# Patient Record
Sex: FEMALE | Race: WHITE
[De-identification: names, ages, dates, MRNs, and addresses within clinical notes are randomized per-mention and may not be internally consistent; named-entity substitution may affect disease eponyms.]

---

## 2019-03-30 ENCOUNTER — HOSPITAL ENCOUNTER (EMERGENCY)
Dept: HOSPITAL 94 - ER | Age: 84
Discharge: HOME | End: 2019-03-30
Payer: MEDICARE

## 2019-03-30 VITALS — SYSTOLIC BLOOD PRESSURE: 110 MMHG | DIASTOLIC BLOOD PRESSURE: 82 MMHG

## 2019-03-30 VITALS — WEIGHT: 156.53 LBS | BODY MASS INDEX: 25.16 KG/M2 | HEIGHT: 66 IN

## 2019-03-30 DIAGNOSIS — F03.90: ICD-10-CM

## 2019-03-30 DIAGNOSIS — Z88.2: ICD-10-CM

## 2019-03-30 DIAGNOSIS — L03.115: Primary | ICD-10-CM

## 2019-03-30 LAB
ALBUMIN SERPL BCP-MCNC: 2.9 G/DL (ref 3.4–5)
ALBUMIN/GLOB SERPL: 0.6 {RATIO} (ref 1.1–1.5)
ALP SERPL-CCNC: 74 IU/L (ref 46–116)
ALT SERPL W P-5'-P-CCNC: 26 U/L (ref 12–78)
ANION GAP SERPL CALCULATED.3IONS-SCNC: 7 MMOL/L (ref 8–16)
AST SERPL W P-5'-P-CCNC: 20 U/L (ref 10–37)
BASOPHILS # BLD AUTO: 0.1 X10'3 (ref 0–0.2)
BASOPHILS NFR BLD AUTO: 0.6 % (ref 0–1)
BILIRUB SERPL-MCNC: 0.3 MG/DL (ref 0.1–1)
BUN SERPL-MCNC: 19 MG/DL (ref 7–18)
BUN/CREAT SERPL: 20.9 (ref 6.6–38)
CALCIUM SERPL-MCNC: 9.3 MG/DL (ref 8.5–10.1)
CHLORIDE SERPL-SCNC: 100 MMOL/L (ref 99–107)
CO2 SERPL-SCNC: 28.7 MMOL/L (ref 24–32)
CREAT SERPL-MCNC: 0.91 MG/DL (ref 0.4–0.9)
EOSINOPHIL # BLD AUTO: 2.7 X10'3 (ref 0–0.9)
EOSINOPHIL NFR BLD AUTO: 22.3 % (ref 0–6)
ERYTHROCYTE [DISTWIDTH] IN BLOOD BY AUTOMATED COUNT: 15.5 % (ref 11.5–14.5)
GFR SERPL CREATININE-BSD FRML MDRD: 58 ML/MIN
GLUCOSE SERPL-MCNC: 108 MG/DL (ref 70–104)
HCT VFR BLD AUTO: 37.6 % (ref 35–45)
HGB BLD-MCNC: 12.1 G/DL (ref 12–16)
LYMPHOCYTES # BLD AUTO: 1.9 X10'3 (ref 1.1–4.8)
LYMPHOCYTES NFR BLD AUTO: 16.4 % (ref 21–51)
MCH RBC QN AUTO: 28 PG (ref 27–31)
MCHC RBC AUTO-ENTMCNC: 32.3 G/DL (ref 33–36.5)
MCV RBC AUTO: 86.8 FL (ref 78–98)
MONOCYTES # BLD AUTO: 0.8 X10'3 (ref 0–0.9)
MONOCYTES NFR BLD AUTO: 7.1 % (ref 2–12)
NEUTROPHILS # BLD AUTO: 6.4 X10'3 (ref 1.8–7.7)
NEUTROPHILS NFR BLD AUTO: 53.6 % (ref 42–75)
PLATELET # BLD AUTO: 361 X10'3 (ref 140–440)
PMV BLD AUTO: 8.4 FL (ref 7.4–10.4)
POTASSIUM SERPL-SCNC: 4.5 MMOL/L (ref 3.5–5.1)
PROT SERPL-MCNC: 7.4 G/DL (ref 6.4–8.2)
RBC # BLD AUTO: 4.33 X10'6 (ref 4.2–5.6)
SODIUM SERPL-SCNC: 136 MMOL/L (ref 135–145)
WBC # BLD AUTO: 11.9 X10'3 (ref 4.5–11)

## 2019-03-30 PROCEDURE — 80053 COMPREHEN METABOLIC PANEL: CPT

## 2019-03-30 PROCEDURE — 84145 PROCALCITONIN (PCT): CPT

## 2019-03-30 PROCEDURE — 87040 BLOOD CULTURE FOR BACTERIA: CPT

## 2019-03-30 PROCEDURE — 36415 COLL VENOUS BLD VENIPUNCTURE: CPT

## 2019-03-30 PROCEDURE — 87070 CULTURE OTHR SPECIMN AEROBIC: CPT

## 2019-03-30 PROCEDURE — 99285 EMERGENCY DEPT VISIT HI MDM: CPT

## 2019-03-30 PROCEDURE — 73630 X-RAY EXAM OF FOOT: CPT

## 2019-03-30 PROCEDURE — 96365 THER/PROPH/DIAG IV INF INIT: CPT

## 2019-03-30 PROCEDURE — 99284 EMERGENCY DEPT VISIT MOD MDM: CPT

## 2019-03-30 PROCEDURE — 87077 CULTURE AEROBIC IDENTIFY: CPT

## 2019-03-30 PROCEDURE — 83605 ASSAY OF LACTIC ACID: CPT

## 2019-03-30 PROCEDURE — 87186 SC STD MICRODIL/AGAR DIL: CPT

## 2019-03-30 PROCEDURE — 96375 TX/PRO/DX INJ NEW DRUG ADDON: CPT

## 2019-03-30 PROCEDURE — 96368 THER/DIAG CONCURRENT INF: CPT

## 2019-03-30 PROCEDURE — 85025 COMPLETE CBC W/AUTO DIFF WBC: CPT

## 2019-03-30 NOTE — NUR
spoke with Dougie over the phone update him on poc for pt. I let him know that pt 
will be going back to [facility], all questions answered. I also call [facility] and notified them that pt will be returning. [facility] [employer] has been 
called to transport pt.

## 2019-03-31 ENCOUNTER — HOSPITAL ENCOUNTER (INPATIENT)
Dept: HOSPITAL 94 - ER | Age: 84
LOS: 3 days | Discharge: NURSING, REHAB OR LONG-TERM CARE | End: 2019-04-03
Payer: MEDICARE

## 2019-03-31 DIAGNOSIS — E86.0: Primary | ICD-10-CM
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
## 2019-03-31 DIAGNOSIS — R21: ICD-10-CM

## 2019-04-19 ENCOUNTER — HOSPITAL ENCOUNTER (INPATIENT)
Dept: HOSPITAL 94 - ER | Age: 84
LOS: 3 days | DRG: 871 | End: 2019-04-22
Attending: INTERNAL MEDICINE | Admitting: INTERNAL MEDICINE
Payer: MEDICARE

## 2019-04-19 VITALS — DIASTOLIC BLOOD PRESSURE: 71 MMHG | SYSTOLIC BLOOD PRESSURE: 93 MMHG

## 2019-04-19 VITALS — DIASTOLIC BLOOD PRESSURE: 50 MMHG | SYSTOLIC BLOOD PRESSURE: 139 MMHG

## 2019-04-19 VITALS — HEIGHT: 68 IN | WEIGHT: 121.25 LBS | BODY MASS INDEX: 18.38 KG/M2

## 2019-04-19 DIAGNOSIS — L89.92: ICD-10-CM

## 2019-04-19 DIAGNOSIS — Z88.1: ICD-10-CM

## 2019-04-19 DIAGNOSIS — E87.2: ICD-10-CM

## 2019-04-19 DIAGNOSIS — E87.6: ICD-10-CM

## 2019-04-19 DIAGNOSIS — Z51.5: ICD-10-CM

## 2019-04-19 DIAGNOSIS — L03.116: ICD-10-CM

## 2019-04-19 DIAGNOSIS — Z79.82: ICD-10-CM

## 2019-04-19 DIAGNOSIS — A41.9: Primary | ICD-10-CM

## 2019-04-19 DIAGNOSIS — E86.0: ICD-10-CM

## 2019-04-19 DIAGNOSIS — G93.41: ICD-10-CM

## 2019-04-19 DIAGNOSIS — E87.0: ICD-10-CM

## 2019-04-19 DIAGNOSIS — Z88.2: ICD-10-CM

## 2019-04-19 DIAGNOSIS — R65.20: ICD-10-CM

## 2019-04-19 DIAGNOSIS — Z99.3: ICD-10-CM

## 2019-04-19 DIAGNOSIS — A04.72: ICD-10-CM

## 2019-04-19 DIAGNOSIS — Z88.8: ICD-10-CM

## 2019-04-19 DIAGNOSIS — Z66: ICD-10-CM

## 2019-04-19 DIAGNOSIS — N17.9: ICD-10-CM

## 2019-04-19 DIAGNOSIS — Z79.899: ICD-10-CM

## 2019-04-19 DIAGNOSIS — F03.90: ICD-10-CM

## 2019-04-19 LAB
ALBUMIN SERPL BCP-MCNC: 2.1 G/DL (ref 3.4–5)
ALBUMIN/GLOB SERPL: 0.5 {RATIO} (ref 1.1–1.5)
ALP SERPL-CCNC: 99 IU/L (ref 46–116)
ALT SERPL W P-5'-P-CCNC: 50 U/L (ref 12–78)
ANION GAP SERPL CALCULATED.3IONS-SCNC: 13 MMOL/L (ref 8–16)
ANISOCYTOSIS BLD QL SMEAR: (no result)
APTT PPP: 28 SECONDS (ref 22–32)
AST SERPL W P-5'-P-CCNC: 80 U/L (ref 10–37)
BACTERIA URNS QL MICRO: (no result) /HPF
BASOPHILS # BLD AUTO: 0 X10'3 (ref 0–0.2)
BASOPHILS NFR BLD AUTO: 0 % (ref 0–1)
BILIRUB SERPL-MCNC: 0.4 MG/DL (ref 0.1–1)
BUN SERPL-MCNC: 66 MG/DL (ref 7–18)
BUN/CREAT SERPL: 20.4 (ref 6.6–38)
CALCIUM SERPL-MCNC: 8.7 MG/DL (ref 8.5–10.1)
CHLORIDE SERPL-SCNC: 111 MMOL/L (ref 99–107)
CLARITY UR: (no result)
CO2 SERPL-SCNC: 23.9 MMOL/L (ref 24–32)
COLOR UR: YELLOW
CREAT SERPL-MCNC: 3.24 MG/DL (ref 0.4–0.9)
DACRYOCYTES BLD QL SMEAR: (no result)
DEPRECATED SQUAMOUS URNS QL MICRO: (no result) /LPF
EOSINOPHIL # BLD AUTO: 0 X10'3 (ref 0–0.9)
EOSINOPHIL NFR BLD AUTO: 0.1 % (ref 0–6)
ERYTHROCYTE [DISTWIDTH] IN BLOOD BY AUTOMATED COUNT: 17.3 % (ref 11.5–14.5)
GFR SERPL CREATININE-BSD FRML MDRD: 13 ML/MIN
GLUCOSE SERPL-MCNC: 136 MG/DL (ref 70–104)
GLUCOSE UR STRIP-MCNC: NEGATIVE MG/DL
HCG UR QL: NEGATIVE
HCT VFR BLD AUTO: 32.1 % (ref 35–45)
HGB BLD-MCNC: 10.2 G/DL (ref 12–16)
HGB UR QL STRIP: NEGATIVE
HYPOCHROMIA BLD QL SMEAR: (no result)
INR PPP: 1.2 INR
KETONES UR STRIP-MCNC: NEGATIVE MG/DL
LEUKOCYTE ESTERASE UR QL STRIP: (no result)
LYMPHOCYTES # BLD AUTO: 1 X10'3 (ref 1.1–4.8)
LYMPHOCYTES NFR BLD AUTO: 3.1 % (ref 21–51)
LYMPHOCYTES NFR BLD MANUAL: 2 % (ref 21–51)
MAGNESIUM SERPL-MCNC: 2.4 MG/DL (ref 1.5–2.4)
MCH RBC QN AUTO: 27.2 PG (ref 27–31)
MCHC RBC AUTO-ENTMCNC: 31.8 G/DL (ref 33–36.5)
MCV RBC AUTO: 85.3 FL (ref 78–98)
METAMYELOCYTES NFR BLD MANUAL: 1 % (ref 0–0)
MONOCYTES # BLD AUTO: 1.9 X10'3 (ref 0–0.9)
MONOCYTES NFR BLD AUTO: 5.9 % (ref 2–12)
MONOCYTES NFR BLD MANUAL: 6 % (ref 2–12)
MUCOUS THREADS URNS QL MICRO: (no result) /LPF
NEUTROPHILS # BLD AUTO: 29 X10'3 (ref 1.8–7.7)
NEUTROPHILS NFR BLD AUTO: 90.9 % (ref 42–75)
NEUTS BAND # BLD MANUAL: 14 % (ref 0–10)
NEUTS SEG NFR BLD MANUAL: 77 % (ref 42–75)
NEUTS VAC BLD QL SMEAR: (no result)
NITRITE UR QL STRIP: NEGATIVE
PH UR STRIP: 5 [PH] (ref 4.8–8)
PLATELET # BLD AUTO: 363 X10'3 (ref 140–440)
PLATELET BLD QL SMEAR: NORMAL
PMV BLD AUTO: 9.1 FL (ref 7.4–10.4)
POLYCHROMASIA BLD QL SMEAR: (no result)
POTASSIUM SERPL-SCNC: 4.2 MMOL/L (ref 3.5–5.1)
PROT SERPL-MCNC: 6.5 G/DL (ref 6.4–8.2)
PROT UR QL STRIP: (no result) MG/DL
RBC # BLD AUTO: 3.77 X10'6 (ref 4.2–5.6)
RBC #/AREA URNS HPF: (no result) /HPF (ref 0–2)
RBC MORPH BLD: (no result)
SCHISTOCYTES BLD QL SMEAR: (no result)
SODIUM SERPL-SCNC: 148 MMOL/L (ref 135–145)
SP GR UR STRIP: 1.02 (ref 1–1.03)
TOTAL CELLS COUNTED FLD: 100
TOXIC GRANULES BLD QL SMEAR: (no result)
URN COLLECT METHOD CLASS: (no result)
UROBILINOGEN UR STRIP-MCNC: 0.2 E.U/DL (ref 0.2–1)
WBC # BLD AUTO: 31.9 X10'3 (ref 4.5–11)
WBC #/AREA URNS HPF: (no result) /HPF (ref 0–4)

## 2019-04-19 PROCEDURE — 85730 THROMBOPLASTIN TIME PARTIAL: CPT

## 2019-04-19 PROCEDURE — 85610 PROTHROMBIN TIME: CPT

## 2019-04-19 PROCEDURE — 99291 CRITICAL CARE FIRST HOUR: CPT

## 2019-04-19 PROCEDURE — 36415 COLL VENOUS BLD VENIPUNCTURE: CPT

## 2019-04-19 PROCEDURE — 81025 URINE PREGNANCY TEST: CPT

## 2019-04-19 PROCEDURE — 85025 COMPLETE CBC W/AUTO DIFF WBC: CPT

## 2019-04-19 PROCEDURE — 97162 PT EVAL MOD COMPLEX 30 MIN: CPT

## 2019-04-19 PROCEDURE — 84145 PROCALCITONIN (PCT): CPT

## 2019-04-19 PROCEDURE — 87070 CULTURE OTHR SPECIMN AEROBIC: CPT

## 2019-04-19 PROCEDURE — 74176 CT ABD & PELVIS W/O CONTRAST: CPT

## 2019-04-19 PROCEDURE — 80048 BASIC METABOLIC PNL TOTAL CA: CPT

## 2019-04-19 PROCEDURE — 80053 COMPREHEN METABOLIC PANEL: CPT

## 2019-04-19 PROCEDURE — 96365 THER/PROPH/DIAG IV INF INIT: CPT

## 2019-04-19 PROCEDURE — 80202 ASSAY OF VANCOMYCIN: CPT

## 2019-04-19 PROCEDURE — 87040 BLOOD CULTURE FOR BACTERIA: CPT

## 2019-04-19 PROCEDURE — 87088 URINE BACTERIA CULTURE: CPT

## 2019-04-19 PROCEDURE — 85651 RBC SED RATE NONAUTOMATED: CPT

## 2019-04-19 PROCEDURE — 71045 X-RAY EXAM CHEST 1 VIEW: CPT

## 2019-04-19 PROCEDURE — 96361 HYDRATE IV INFUSION ADD-ON: CPT

## 2019-04-19 PROCEDURE — 87324 CLOSTRIDIUM AG IA: CPT

## 2019-04-19 PROCEDURE — 87449 NOS EACH ORGANISM AG IA: CPT

## 2019-04-19 PROCEDURE — 97110 THERAPEUTIC EXERCISES: CPT

## 2019-04-19 PROCEDURE — 73718 MRI LOWER EXTREMITY W/O DYE: CPT

## 2019-04-19 PROCEDURE — 83605 ASSAY OF LACTIC ACID: CPT

## 2019-04-19 PROCEDURE — 83735 ASSAY OF MAGNESIUM: CPT

## 2019-04-19 PROCEDURE — 96367 TX/PROPH/DG ADDL SEQ IV INF: CPT

## 2019-04-19 PROCEDURE — 81001 URINALYSIS AUTO W/SCOPE: CPT

## 2019-04-19 PROCEDURE — 93005 ELECTROCARDIOGRAM TRACING: CPT

## 2019-04-19 RX ADMIN — HEPARIN SODIUM SCH UNIT: 5000 INJECTION, SOLUTION INTRAVENOUS; SUBCUTANEOUS at 19:58

## 2019-04-19 RX ADMIN — SODIUM CHLORIDE SCH MLS/HR: 9 INJECTION INTRAMUSCULAR; INTRAVENOUS; SUBCUTANEOUS at 19:08

## 2019-04-19 RX ADMIN — HYDROCODONE BITARTRATE AND ACETAMINOPHEN PRN TAB: 5; 325 TABLET ORAL at 19:55

## 2019-04-19 NOTE — NUR
Problems reprioritized. Patient report given, questions answered & plan of care reviewed 
with DAKOTA Perez.

## 2019-04-19 NOTE — NUR
patient still with clean betty area,repositioned for comfort,picture taken to 
left heel and superior plantar area,dorsal right and left foot +lateral area, 
2x2 dressing applied with non adherent pads,kerlix and ace wrap.Dr. Ovalle 
present.

## 2019-04-19 NOTE — NUR
Following shift report pt, was approached by this nurse. Alert, very confused, 
unintelligible. Unable to complete admitting information process. Will advise DAY shift to 
include son in obtaining this information.

## 2019-04-19 NOTE — NUR
excoriation to inner buttock aea,picture taken,betty care provided.urine and 
stool specimen sent to lab.continue with contact precaution r/o cdiff.

## 2019-04-19 NOTE — NUR
Pt. cont. incont of urine and liquid stool, screaming when staff provides betty care. 
Betty/anal area very reddened and excoriated, bleeding in some areas when gently cleaned. MD 
notified and orders given. Larson placed, immed. draining lge amt blanca urine, barrier cream 
applied, repositioned freq. Pt. screams out w/all mvmt. Placed on [employer] in order to 
obtain current, accurate information regarding HR and rhythm, BP, and O2 sats. Heels 
elevated and floated f/comfort. Drsgs remain to feet bilat, CDI. Pt. C/O pain w/all mvmt of 
lower exts. Refusing offers of PO food or fluid.

## 2019-04-19 NOTE — NUR
Patient in room PCU 3019. I have received report from Rosie DUNCAN  and had the opportunity to 
ask questions and assume patient care.

## 2019-04-20 VITALS — SYSTOLIC BLOOD PRESSURE: 125 MMHG | DIASTOLIC BLOOD PRESSURE: 60 MMHG

## 2019-04-20 VITALS — SYSTOLIC BLOOD PRESSURE: 96 MMHG | DIASTOLIC BLOOD PRESSURE: 43 MMHG

## 2019-04-20 VITALS — SYSTOLIC BLOOD PRESSURE: 114 MMHG | DIASTOLIC BLOOD PRESSURE: 42 MMHG

## 2019-04-20 VITALS — DIASTOLIC BLOOD PRESSURE: 59 MMHG | SYSTOLIC BLOOD PRESSURE: 90 MMHG

## 2019-04-20 VITALS — DIASTOLIC BLOOD PRESSURE: 49 MMHG | SYSTOLIC BLOOD PRESSURE: 114 MMHG

## 2019-04-20 LAB
ALBUMIN SERPL BCP-MCNC: 1.2 G/DL (ref 3.4–5)
ALBUMIN SERPL BCP-MCNC: 1.6 G/DL (ref 3.4–5)
ALBUMIN/GLOB SERPL: 0.4 {RATIO} (ref 1.1–1.5)
ALP SERPL-CCNC: 91 IU/L (ref 46–116)
ALT SERPL W P-5'-P-CCNC: 46 U/L (ref 12–78)
ANION GAP SERPL CALCULATED.3IONS-SCNC: 16 MMOL/L (ref 8–16)
ANION GAP SERPL CALCULATED.3IONS-SCNC: 16 MMOL/L (ref 8–16)
ANISOCYTOSIS BLD QL SMEAR: (no result)
AST SERPL W P-5'-P-CCNC: 72 U/L (ref 10–37)
BASOPHILS # BLD AUTO: (no result) X10'3 (ref 0–0.2)
BASOPHILS # BLD AUTO: 0.1 X10'3 (ref 0–0.2)
BASOPHILS # BLD AUTO: 0.1 X10'3 (ref 0–0.2)
BASOPHILS NFR BLD AUTO: (no result) % (ref 0–1)
BASOPHILS NFR BLD AUTO: 0.3 % (ref 0–1)
BASOPHILS NFR BLD AUTO: 0.3 % (ref 0–1)
BILIRUB SERPL-MCNC: 0.4 MG/DL (ref 0.1–1)
BUN SERPL-MCNC: 58 MG/DL (ref 7–18)
BUN SERPL-MCNC: 61 MG/DL (ref 7–18)
BUN/CREAT SERPL: 26.7 (ref 6.6–38)
BUN/CREAT SERPL: 27.2 (ref 6.6–38)
C DIFF SPECIMEN=DIARRHEA?: (no result)
C DIFF TOX A+B STL QL IA: POSITIVE
C DIFF TOX A+B STL QL: POSITIVE
CALCIUM SERPL-MCNC: 7 MG/DL (ref 8.5–10.1)
CALCIUM SERPL-MCNC: 7.5 MG/DL (ref 8.5–10.1)
CHLORIDE SERPL-SCNC: 119 MMOL/L (ref 99–107)
CHLORIDE SERPL-SCNC: 120 MMOL/L (ref 99–107)
CO2 SERPL-SCNC: 16.6 MMOL/L (ref 24–32)
CO2 SERPL-SCNC: 18.4 MMOL/L (ref 24–32)
CREAT SERPL-MCNC: 2.17 MG/DL (ref 0.4–0.9)
CREAT SERPL-MCNC: 2.24 MG/DL (ref 0.4–0.9)
DACRYOCYTES BLD QL SMEAR: (no result)
EOSINOPHIL # BLD AUTO: (no result) X10'3 (ref 0–0.9)
EOSINOPHIL # BLD AUTO: 0 X10'3 (ref 0–0.9)
EOSINOPHIL # BLD AUTO: 0.1 X10'3 (ref 0–0.9)
EOSINOPHIL NFR BLD AUTO: (no result) % (ref 0–6)
EOSINOPHIL NFR BLD AUTO: 0.1 % (ref 0–6)
EOSINOPHIL NFR BLD AUTO: 0.4 % (ref 0–6)
EOSINOPHIL NFR BLD MANUAL: 1 % (ref 0–6)
ERYTHROCYTE [DISTWIDTH] IN BLOOD BY AUTOMATED COUNT: 16.6 % (ref 11.5–14.5)
ERYTHROCYTE [DISTWIDTH] IN BLOOD BY AUTOMATED COUNT: 16.8 % (ref 11.5–14.5)
ERYTHROCYTE [DISTWIDTH] IN BLOOD BY AUTOMATED COUNT: 16.9 % (ref 11.5–14.5)
GFR SERPL CREATININE-BSD FRML MDRD: 21 ML/MIN
GFR SERPL CREATININE-BSD FRML MDRD: 21 ML/MIN
GLUCOSE SERPL-MCNC: 104 MG/DL (ref 70–104)
GLUCOSE SERPL-MCNC: 136 MG/DL (ref 70–104)
HCT VFR BLD AUTO: 30.1 % (ref 35–45)
HCT VFR BLD AUTO: 30.6 % (ref 35–45)
HCT VFR BLD AUTO: 33.5 % (ref 35–45)
HGB BLD-MCNC: 10.7 G/DL (ref 12–16)
HGB BLD-MCNC: 9.7 G/DL (ref 12–16)
HGB BLD-MCNC: 9.7 G/DL (ref 12–16)
LYMPHOCYTES # BLD AUTO: (no result) X10'3 (ref 1.1–4.8)
LYMPHOCYTES # BLD AUTO: 1.5 X10'3 (ref 1.1–4.8)
LYMPHOCYTES # BLD AUTO: 2.5 X10'3 (ref 1.1–4.8)
LYMPHOCYTES NFR BLD AUTO: (no result) % (ref 21–51)
LYMPHOCYTES NFR BLD AUTO: 5 % (ref 21–51)
LYMPHOCYTES NFR BLD AUTO: 7.2 % (ref 21–51)
LYMPHOCYTES NFR BLD MANUAL: 1 % (ref 21–51)
LYMPHOCYTES NFR BLD MANUAL: 3 % (ref 21–51)
LYMPHOCYTES NFR BLD MANUAL: 3 % (ref 21–51)
MAGNESIUM SERPL-MCNC: 2.1 MG/DL (ref 1.5–2.4)
MCH RBC QN AUTO: 27.2 PG (ref 27–31)
MCH RBC QN AUTO: 27.3 PG (ref 27–31)
MCH RBC QN AUTO: 27.7 PG (ref 27–31)
MCHC RBC AUTO-ENTMCNC: 31.7 G/DL (ref 33–36.5)
MCHC RBC AUTO-ENTMCNC: 31.9 G/DL (ref 33–36.5)
MCHC RBC AUTO-ENTMCNC: 32.3 G/DL (ref 33–36.5)
MCV RBC AUTO: 85.2 FL (ref 78–98)
MCV RBC AUTO: 85.6 FL (ref 78–98)
MCV RBC AUTO: 86.1 FL (ref 78–98)
METAMYELOCYTES NFR BLD MANUAL: 1 % (ref 0–0)
METAMYELOCYTES NFR BLD MANUAL: 2 % (ref 0–0)
METAMYELOCYTES NFR BLD MANUAL: 4 % (ref 0–0)
MONOCYTES # BLD AUTO: (no result) X10'3 (ref 0–0.9)
MONOCYTES # BLD AUTO: 2.2 X10'3 (ref 0–0.9)
MONOCYTES # BLD AUTO: 2.7 X10'3 (ref 0–0.9)
MONOCYTES NFR BLD AUTO: (no result) % (ref 2–12)
MONOCYTES NFR BLD AUTO: 7.2 % (ref 2–12)
MONOCYTES NFR BLD AUTO: 7.9 % (ref 2–12)
MONOCYTES NFR BLD MANUAL: 10 % (ref 2–12)
MONOCYTES NFR BLD MANUAL: 2 % (ref 2–12)
MONOCYTES NFR BLD MANUAL: 6 % (ref 2–12)
NEUTROPHILS # BLD AUTO: (no result) X10'3 (ref 1.8–7.7)
NEUTROPHILS # BLD AUTO: 26.4 X10'3 (ref 1.8–7.7)
NEUTROPHILS # BLD AUTO: 29 X10'3 (ref 1.8–7.7)
NEUTROPHILS NFR BLD AUTO: (no result) % (ref 42–75)
NEUTROPHILS NFR BLD AUTO: 84.2 % (ref 42–75)
NEUTROPHILS NFR BLD AUTO: 87.4 % (ref 42–75)
NEUTS BAND # BLD MANUAL: 14 % (ref 0–10)
NEUTS BAND # BLD MANUAL: 5 % (ref 0–10)
NEUTS BAND # BLD MANUAL: 6 % (ref 0–10)
NEUTS SEG NFR BLD MANUAL: 77 % (ref 42–75)
NEUTS SEG NFR BLD MANUAL: 81 % (ref 42–75)
NEUTS SEG NFR BLD MANUAL: 84 % (ref 42–75)
NEUTS VAC BLD QL SMEAR: (no result)
NEUTS VAC BLD QL SMEAR: (no result)
OVALOCYTES BLD QL SMEAR: (no result)
OVALOCYTES BLD QL SMEAR: (no result)
PLATELET # BLD AUTO: 350 X10'3 (ref 140–440)
PLATELET # BLD AUTO: 357 X10'3 (ref 140–440)
PLATELET # BLD AUTO: 416 X10'3 (ref 140–440)
PLATELET BLD QL SMEAR: NORMAL
PMV BLD AUTO: 9.2 FL (ref 7.4–10.4)
PMV BLD AUTO: 9.6 FL (ref 7.4–10.4)
PMV BLD AUTO: 9.6 FL (ref 7.4–10.4)
POLYCHROMASIA BLD QL SMEAR: (no result)
POTASSIUM SERPL-SCNC: 3.6 MMOL/L (ref 3.5–5.1)
POTASSIUM SERPL-SCNC: 3.7 MMOL/L (ref 3.5–5.1)
PROT SERPL-MCNC: 5.3 G/DL (ref 6.4–8.2)
RBC # BLD AUTO: 3.51 X10'6 (ref 4.2–5.6)
RBC # BLD AUTO: 3.55 X10'6 (ref 4.2–5.6)
RBC # BLD AUTO: 3.93 X10'6 (ref 4.2–5.6)
RBC MORPH BLD: (no result)
SCHISTOCYTES BLD QL SMEAR: (no result)
SODIUM SERPL-SCNC: 153 MMOL/L (ref 135–145)
SODIUM SERPL-SCNC: 153 MMOL/L (ref 135–145)
TOTAL CELLS COUNTED FLD: 100
TOXIC GRANULES BLD QL SMEAR: (no result)
VANCOMYCIN SERPL-MCNC: 10.5 UG/ML
WBC # BLD AUTO: 30.2 X10'3 (ref 4.5–11)
WBC # BLD AUTO: 34.4 X10'3 (ref 4.5–11)
WBC # BLD AUTO: 37.9 X10'3 (ref 4.5–11)

## 2019-04-20 RX ADMIN — VANCOMYCIN HYDROCHLORIDE SCH MG: 250 CAPSULE ORAL at 09:17

## 2019-04-20 RX ADMIN — VANCOMYCIN HYDROCHLORIDE SCH MG: 250 CAPSULE ORAL at 20:45

## 2019-04-20 RX ADMIN — TAZOBACTAM SODIUM AND PIPERACILLIN SODIUM SCH MLS/HR: 375; 3 INJECTION, SOLUTION INTRAVENOUS at 17:19

## 2019-04-20 RX ADMIN — HEPARIN SODIUM SCH UNIT: 5000 INJECTION, SOLUTION INTRAVENOUS; SUBCUTANEOUS at 20:45

## 2019-04-20 RX ADMIN — HEPARIN SODIUM SCH UNIT: 5000 INJECTION, SOLUTION INTRAVENOUS; SUBCUTANEOUS at 09:16

## 2019-04-20 RX ADMIN — TAZOBACTAM SODIUM AND PIPERACILLIN SODIUM SCH MLS/HR: 375; 3 INJECTION, SOLUTION INTRAVENOUS at 23:42

## 2019-04-20 RX ADMIN — VANCOMYCIN HYDROCHLORIDE SCH MG: 250 CAPSULE ORAL at 14:36

## 2019-04-20 RX ADMIN — SODIUM CHLORIDE SCH MLS/HR: 9 INJECTION INTRAMUSCULAR; INTRAVENOUS; SUBCUTANEOUS at 04:11

## 2019-04-20 NOTE — NUR
Problems reprioritized. Patient report given, questions answered & plan of care reviewed 
with Julia DUNCAN.

## 2019-04-20 NOTE — NUR
Sent to [employer] [employer]

MESSAGE:  room 3019 A; Terri Chadwick: Patient has a positive CDIFF lab result. Thank you, 
Julia

## 2019-04-20 NOTE — NUR
Sent to [facility]

MESSAGE:  Rm 1381 A; Terri Chadwick: Suzanne Gamez, lab just called me and WBC count is up 
from 30.2 to 37.9. Patients vital signs are 103/45, HR 64, RR 20, and patient was desating 
to 82% O2. I put 3 L on NC to increase sats. O2 is now 99% on 1 L

## 2019-04-20 NOTE — NUR
PAGER ID:  [phone] 

MESSAGE:  Patient Terri Chadwick in room 3019A has only had an output of 200mL all day 
shift. [facility] [id number]

## 2019-04-20 NOTE — NUR
Patient Terri Chadwick in room 3019A has only had an output of 200mL all day shift. [facility] 
[place] [address] DR Singh

## 2019-04-20 NOTE — NUR
Sent to [facility]

room 3019, Terri Chadwick:

Critical labs this morning 4/20 resulted:

Na: 153

Cl: 119

WBC:30.2

Patient is on NS @ 100ml/hr

## 2019-04-20 NOTE — NUR
Sent to [employer]

MESSAGE:  Rm [id number] Terri Chadwick: Paul sorry to page you again, but this patient has had a 
urine output of 200 ml this past 12 hrs. Thank you, Julia

## 2019-04-21 VITALS — DIASTOLIC BLOOD PRESSURE: 37 MMHG | SYSTOLIC BLOOD PRESSURE: 89 MMHG

## 2019-04-21 VITALS — SYSTOLIC BLOOD PRESSURE: 94 MMHG | DIASTOLIC BLOOD PRESSURE: 50 MMHG

## 2019-04-21 VITALS — DIASTOLIC BLOOD PRESSURE: 62 MMHG | SYSTOLIC BLOOD PRESSURE: 95 MMHG

## 2019-04-21 VITALS — DIASTOLIC BLOOD PRESSURE: 65 MMHG | SYSTOLIC BLOOD PRESSURE: 94 MMHG

## 2019-04-21 LAB
ALBUMIN SERPL BCP-MCNC: 2.4 G/DL (ref 3.4–5)
ALBUMIN/GLOB SERPL: 0.9 {RATIO} (ref 1.1–1.5)
ALP SERPL-CCNC: 79 IU/L (ref 46–116)
ALT SERPL W P-5'-P-CCNC: 44 U/L (ref 12–78)
ANION GAP SERPL CALCULATED.3IONS-SCNC: 13 MMOL/L (ref 8–16)
ANISOCYTOSIS BLD QL SMEAR: (no result)
AST SERPL W P-5'-P-CCNC: 69 U/L (ref 10–37)
BASOPHILS # BLD AUTO: 0.1 X10'3 (ref 0–0.2)
BASOPHILS NFR BLD AUTO: 0.2 % (ref 0–1)
BILIRUB SERPL-MCNC: 0.6 MG/DL (ref 0.1–1)
BUN SERPL-MCNC: 59 MG/DL (ref 7–18)
BUN/CREAT SERPL: 26.7 (ref 6.6–38)
CALCIUM SERPL-MCNC: 7.2 MG/DL (ref 8.5–10.1)
CHLORIDE SERPL-SCNC: 121 MMOL/L (ref 99–107)
CO2 SERPL-SCNC: 17.7 MMOL/L (ref 24–32)
CREAT SERPL-MCNC: 2.21 MG/DL (ref 0.4–0.9)
EOSINOPHIL # BLD AUTO: 0.2 X10'3 (ref 0–0.9)
EOSINOPHIL NFR BLD AUTO: 0.6 % (ref 0–6)
ERYTHROCYTE [DISTWIDTH] IN BLOOD BY AUTOMATED COUNT: 17.4 % (ref 11.5–14.5)
GFR SERPL CREATININE-BSD FRML MDRD: 21 ML/MIN
GLUCOSE SERPL-MCNC: 154 MG/DL (ref 70–104)
HCT VFR BLD AUTO: 28.1 % (ref 35–45)
HGB BLD-MCNC: 9.1 G/DL (ref 12–16)
LYMPHOCYTES # BLD AUTO: 2.1 X10'3 (ref 1.1–4.8)
LYMPHOCYTES NFR BLD AUTO: 5.9 % (ref 21–51)
LYMPHOCYTES NFR BLD MANUAL: 5 % (ref 21–51)
MAGNESIUM SERPL-MCNC: 2.1 MG/DL (ref 1.5–2.4)
MCH RBC QN AUTO: 27.7 PG (ref 27–31)
MCHC RBC AUTO-ENTMCNC: 32.4 G/DL (ref 33–36.5)
MCV RBC AUTO: 85.5 FL (ref 78–98)
METAMYELOCYTES NFR BLD MANUAL: 3 % (ref 0–0)
MONOCYTES # BLD AUTO: 2.2 X10'3 (ref 0–0.9)
MONOCYTES NFR BLD AUTO: 6.3 % (ref 2–12)
MONOCYTES NFR BLD MANUAL: 8 % (ref 2–12)
MYELOCYTES NFR BLD MANUAL: 3 % (ref 0–0)
NEUTROPHILS # BLD AUTO: 30.8 X10'3 (ref 1.8–7.7)
NEUTROPHILS NFR BLD AUTO: 87 % (ref 42–75)
NEUTS BAND # BLD MANUAL: 22 % (ref 0–10)
NEUTS SEG NFR BLD MANUAL: 58 % (ref 42–75)
OVALOCYTES BLD QL SMEAR: (no result)
PLATELET # BLD AUTO: 316 X10'3 (ref 140–440)
PLATELET BLD QL SMEAR: NORMAL
PMV BLD AUTO: 9.4 FL (ref 7.4–10.4)
POLYCHROMASIA BLD QL SMEAR: (no result)
POTASSIUM SERPL-SCNC: 3.3 MMOL/L (ref 3.5–5.1)
PROMYELOCYTES NFR BLD MANUAL: 1 % (ref 0–0)
PROT SERPL-MCNC: 5.1 G/DL (ref 6.4–8.2)
RBC # BLD AUTO: 3.29 X10'6 (ref 4.2–5.6)
RBC MORPH BLD: (no result)
SODIUM SERPL-SCNC: 152 MMOL/L (ref 135–145)
TOTAL CELLS COUNTED FLD: 100
TOXIC GRANULES BLD QL SMEAR: (no result)
WBC # BLD AUTO: 35.4 X10'3 (ref 4.5–11)

## 2019-04-21 RX ADMIN — VANCOMYCIN HYDROCHLORIDE SCH MG: 250 CAPSULE ORAL at 14:00

## 2019-04-21 RX ADMIN — HYDROCODONE BITARTRATE AND ACETAMINOPHEN PRN TAB: 5; 325 TABLET ORAL at 07:32

## 2019-04-21 RX ADMIN — DOCUSATE SODIUM SCH MG: 100 CAPSULE, LIQUID FILLED ORAL at 19:35

## 2019-04-21 RX ADMIN — VANCOMYCIN HYDROCHLORIDE SCH MG: 250 CAPSULE ORAL at 07:31

## 2019-04-21 RX ADMIN — MORPHINE SULFATE PRN MG: 10 SOLUTION ORAL at 21:54

## 2019-04-21 RX ADMIN — VANCOMYCIN HYDROCHLORIDE SCH MG: 250 CAPSULE ORAL at 01:36

## 2019-04-21 RX ADMIN — HEPARIN SODIUM SCH UNIT: 5000 INJECTION, SOLUTION INTRAVENOUS; SUBCUTANEOUS at 07:31

## 2019-04-21 RX ADMIN — MORPHINE SULFATE PRN MG: 10 SOLUTION ORAL at 14:49

## 2019-04-21 NOTE — NUR
Patient in room PCU 3019. I have received report from DAKOTA SEN and had the opportunity to 
ask questions and assume patient care. PATIENT ASLEEP FOR PATIENT REPORT. WILL CONTINUE TO 
MONITOR CLOSELY.

## 2019-04-21 NOTE — NUR
RN spoke with Dr. Cheatham re. pt's code status. DNR form is in chart, signed by pt's son 
(LARY) Dougie. RN called and spoke with Dougie to confirm pt's DNR status. Pt. is currently a 
FULL CODE. Dougie confirmed that pt. is a DNR. RN relayed info. to Dr. Cheatham who stated she 
will change code status to DNR and will call Dougie later this shift to discuss plan of care, 
possibly hospice.

## 2019-04-21 NOTE — NUR
Problems reprioritized. Patient report given, questions answered & plan of care reviewed 
with Rachel/Garret RNs.

## 2019-04-22 VITALS — SYSTOLIC BLOOD PRESSURE: 72 MMHG | DIASTOLIC BLOOD PRESSURE: 42 MMHG

## 2019-04-22 RX ADMIN — MORPHINE SULFATE PRN MG: 10 SOLUTION ORAL at 02:21

## 2019-04-22 RX ADMIN — DOCUSATE SODIUM SCH MG: 100 CAPSULE, LIQUID FILLED ORAL at 07:08

## 2019-04-22 NOTE — NUR
PATIENT  AT 2157 ON 2019. DR. MCDANIELS NOTIFIED OF SITUATION. BOTH YANNA HICKS AND MEGHANA HICKS NOTIFIED VIA TELEPHONE AND MESSAGES LEFT TO CONTACT [facility] AT EARLIEST 
CONVENIENCE. DONOR SAMI DENIED PATIENT FOR ORGAN DONATION GIVEN PERTINENT HEALTH 
INFORMATION. [employer] CONTACTED AND SPOKE WITH DARYN OVER TELEPHONE AND NOTIFIED 
 OF PENDING . PATIENT EXITED FLOOR WITH [employer] [facility] AT 2316.

## 2019-04-22 NOTE — NUR
Orientee documentation:

I have reviewed and agree with all interventions, assessments performed and documented by 
Garret.



[facility]ee Medication Administration:

For this medication-pass time frame, all medication were reviewed, dispensed, administered 
and documented per hospital policy by Garret DUNCAN.

## 2019-04-22 NOTE — NUR
Patient in room PCU 3019. I have received report from DAKOTA SR and had the opportunity to ask 
questions and assume patient care.

## 2019-04-22 NOTE — NUR
Problems reprioritized. Patient report given, questions answered & plan of care reviewed 
with DAKOTA Ramos.

## 2019-04-22 NOTE — NUR
Pt has been made DNR w/ comfort care. Will follow per protocol.


-------------------------------------------------------------------------------

Addendum: 04/22/19 at 1214 by [address]

-------------------------------------------------------------------------------

Amended: Links added.
